# Patient Record
Sex: FEMALE | Race: WHITE | NOT HISPANIC OR LATINO
[De-identification: names, ages, dates, MRNs, and addresses within clinical notes are randomized per-mention and may not be internally consistent; named-entity substitution may affect disease eponyms.]

---

## 2022-01-03 ENCOUNTER — TRANSCRIPTION ENCOUNTER (OUTPATIENT)
Age: 67
End: 2022-01-03

## 2023-02-20 ENCOUNTER — APPOINTMENT (OUTPATIENT)
Dept: ORTHOPEDIC SURGERY | Facility: AMBULATORY MEDICAL SERVICES | Age: 68
End: 2023-02-20
Payer: MEDICARE

## 2023-02-20 PROCEDURE — 99284 EMERGENCY DEPT VISIT MOD MDM: CPT

## 2023-02-24 ENCOUNTER — APPOINTMENT (OUTPATIENT)
Dept: ORTHOPEDIC SURGERY | Facility: AMBULATORY MEDICAL SERVICES | Age: 68
End: 2023-02-24
Payer: MEDICARE

## 2023-02-24 PROCEDURE — 27814 TREATMENT OF ANKLE FRACTURE: CPT | Mod: RT

## 2023-02-24 PROCEDURE — 76000 FLUOROSCOPY <1 HR PHYS/QHP: CPT | Mod: 26,59

## 2023-02-24 PROCEDURE — 27530 TREAT KNEE FRACTURE: CPT | Mod: RT

## 2023-02-27 PROBLEM — Z00.00 ENCOUNTER FOR PREVENTIVE HEALTH EXAMINATION: Status: ACTIVE | Noted: 2023-02-27

## 2023-03-02 ENCOUNTER — NON-APPOINTMENT (OUTPATIENT)
Age: 68
End: 2023-03-02

## 2023-03-08 ENCOUNTER — APPOINTMENT (OUTPATIENT)
Dept: ORTHOPEDIC SURGERY | Facility: CLINIC | Age: 68
End: 2023-03-08

## 2023-04-10 ENCOUNTER — APPOINTMENT (OUTPATIENT)
Dept: ORTHOPEDIC SURGERY | Facility: CLINIC | Age: 68
End: 2023-04-10
Payer: MEDICARE

## 2023-04-10 DIAGNOSIS — Z78.9 OTHER SPECIFIED HEALTH STATUS: ICD-10-CM

## 2023-04-10 PROCEDURE — 99024 POSTOP FOLLOW-UP VISIT: CPT

## 2023-04-10 RX ORDER — GABAPENTIN 100 MG/1
CAPSULE ORAL
Refills: 0 | Status: ACTIVE | COMMUNITY

## 2023-04-10 RX ORDER — OXYCODONE HYDROCHLORIDE 30 MG/1
TABLET ORAL
Refills: 0 | Status: ACTIVE | COMMUNITY

## 2023-04-10 RX ORDER — FLUOXETINE HCL 10 MG
TABLET ORAL
Refills: 0 | Status: ACTIVE | COMMUNITY

## 2023-04-10 RX ORDER — ATORVASTATIN CALCIUM 80 MG/1
TABLET, FILM COATED ORAL
Refills: 0 | Status: ACTIVE | COMMUNITY

## 2023-04-10 NOTE — PHYSICAL EXAM
[2___] : eversion 2[unfilled]/5 [4___] : [facility] 4[unfilled]/5 [2+] : posterior tibialis pulse: 2+ [Normal] : saphenous nerve sensation normal [] : uses wheelchair [Right] : right ankle [Outside films reviewed] : Outside films reviewed [No loss of surgical correlation. Bony alignment acceptable. Hardware in appropriate position] : No loss of surgical correlation. Bony alignment acceptable. Hardware in appropriate position [The fracture is in acceptable alignment. There is progression in healing seen] : The fracture is in acceptable alignment. There is progression in healing seen [de-identified] : plantar flexion 10 degrees [[email or web address]] : dorsiflexion 5 degrees

## 2023-05-08 ENCOUNTER — APPOINTMENT (OUTPATIENT)
Dept: ORTHOPEDIC SURGERY | Facility: CLINIC | Age: 68
End: 2023-05-08
Payer: MEDICARE

## 2023-05-08 PROCEDURE — 99024 POSTOP FOLLOW-UP VISIT: CPT

## 2023-05-08 PROCEDURE — 73610 X-RAY EXAM OF ANKLE: CPT | Mod: RT

## 2023-05-08 NOTE — PHYSICAL EXAM
[2+] : posterior tibialis pulse: 2+ [Normal] : saphenous nerve sensation normal [Right] : right ankle [Outside films reviewed] : Outside films reviewed [No loss of surgical correlation. Bony alignment acceptable. Hardware in appropriate position] : No loss of surgical correlation. Bony alignment acceptable. Hardware in appropriate position [The fracture is in acceptable alignment. There is progression in healing seen] : The fracture is in acceptable alignment. There is progression in healing seen [3___] : eversion 3[unfilled]/5 [5___] : [facility] 5[unfilled]/5 [] : ambulation with cane [[email or web address]] : progression in healing seen [de-identified] : plantar flexion 25 degrees [de-identified] : inversion 15 degrees [de-identified] : eversion 10 degrees [[email or web address]] : dorsiflexion 10 degrees

## 2023-05-31 ENCOUNTER — FORM ENCOUNTER (OUTPATIENT)
Age: 68
End: 2023-05-31

## 2023-06-19 ENCOUNTER — APPOINTMENT (OUTPATIENT)
Dept: ORTHOPEDIC SURGERY | Facility: CLINIC | Age: 68
End: 2023-06-19
Payer: MEDICARE

## 2023-06-19 DIAGNOSIS — S82.841D DISPLACED BIMALLEOLAR FRACTURE OF RIGHT LOWER LEG, SUBSEQUENT ENCOUNTER FOR CLOSED FRACTURE WITH ROUTINE HEALING: ICD-10-CM

## 2023-06-19 PROCEDURE — 73610 X-RAY EXAM OF ANKLE: CPT | Mod: RT

## 2023-06-19 PROCEDURE — 99212 OFFICE O/P EST SF 10 MIN: CPT

## 2023-06-19 NOTE — PHYSICAL EXAM
[5___] : [facility] 5[unfilled]/5 [2+] : posterior tibialis pulse: 2+ [Normal] : saphenous nerve sensation normal [Right] : right ankle [Outside films reviewed] : Outside films reviewed [No loss of surgical correlation. Bony alignment acceptable. Hardware in appropriate position] : No loss of surgical correlation. Bony alignment acceptable. Hardware in appropriate position [The fracture is in acceptable alignment. There is progression in healing seen] : The fracture is in acceptable alignment. There is progression in healing seen [] : no posteromedial tibia tenderness [4___] : eversion 4[unfilled]/5 [[email or web address]] : progression in healing seen [de-identified] : plantar flexion 30 degrees [de-identified] : eversion 15 degrees [de-identified] : inversion 20 degrees [[email or web address]] : dorsiflexion 15 degrees

## 2023-09-18 ENCOUNTER — OFFICE (OUTPATIENT)
Dept: URBAN - METROPOLITAN AREA CLINIC 8 | Facility: CLINIC | Age: 68
Setting detail: OPHTHALMOLOGY
End: 2023-09-18
Payer: MEDICARE

## 2023-09-18 DIAGNOSIS — H43.811: ICD-10-CM

## 2023-09-18 DIAGNOSIS — H02.834: ICD-10-CM

## 2023-09-18 DIAGNOSIS — H25.13: ICD-10-CM

## 2023-09-18 DIAGNOSIS — H02.831: ICD-10-CM

## 2023-09-18 DIAGNOSIS — H52.4: ICD-10-CM

## 2023-09-18 PROCEDURE — 92014 COMPRE OPH EXAM EST PT 1/>: CPT | Performed by: OPHTHALMOLOGY

## 2023-09-18 PROCEDURE — 92015 DETERMINE REFRACTIVE STATE: CPT | Performed by: OPHTHALMOLOGY

## 2023-09-18 ASSESSMENT — REFRACTION_MANIFEST
OD_VA1: 20/20
OS_VA2: 20/20
OS_AXIS: 095
OS_VA2: 20/20
OS_ADD: +2.50
OD_SPHERE: PLANO
OS_CYLINDER: +0.75
OD_CYLINDER: -0.50
OS_VA1: 20/20
OD_AXIS: 095
OD_ADD: +3.00
OU_VA: 20/20
OU_VA: 20/20
OD_SPHERE: PLANO
OS_VA1: 20/20
OD_CYLINDER: -0.50
OD_ADD: +2.50
OS_SPHERE: +0.50
OD_VA2: 20/20
OS_AXIS: 095
OD_AXIS: 095
OS_ADD: +3.00
OD_VA2: 20/20
OS_CYLINDER: -0.75
OS_SPHERE: +0.50
OD_VA1: 20/20

## 2023-09-18 ASSESSMENT — REFRACTION_AUTOREFRACTION
OS_AXIS: 093
OD_AXIS: 096
OD_SPHERE: +0.75
OS_CYLINDER: -1.00
OS_SPHERE: +1.50
OD_CYLINDER: -0.50

## 2023-09-18 ASSESSMENT — KERATOMETRY
OD_K1POWER_DIOPTERS: 42.75
OS_K1POWER_DIOPTERS: 42.25
OD_AXISANGLE_DEGREES: 090
OS_AXISANGLE_DEGREES: 005
OD_K2POWER_DIOPTERS: 42.75
OS_K2POWER_DIOPTERS: 43.00

## 2023-09-18 ASSESSMENT — SPHEQUIV_DERIVED
OD_SPHEQUIV: 0.5
OS_SPHEQUIV: 0.125
OS_SPHEQUIV: 1
OS_SPHEQUIV: 0.875

## 2023-09-18 ASSESSMENT — LID POSITION - DERMATOCHALASIS
OS_DERMATOCHALASIS: LUL 3+
OD_DERMATOCHALASIS: RUL 3+

## 2023-09-18 ASSESSMENT — VISUAL ACUITY
OD_BCVA: 20/25-1
OS_BCVA: 20/25-1

## 2023-09-18 ASSESSMENT — AXIALLENGTH_DERIVED
OD_AL: 23.6729
OS_AL: 23.5724
OS_AL: 23.5239
OS_AL: 23.8678

## 2023-09-18 ASSESSMENT — CONFRONTATIONAL VISUAL FIELD TEST (CVF)
OS_FINDINGS: FULL
OD_FINDINGS: FULL

## 2025-06-26 ENCOUNTER — OFFICE (OUTPATIENT)
Dept: URBAN - METROPOLITAN AREA CLINIC 8 | Facility: CLINIC | Age: 70
Setting detail: OPHTHALMOLOGY
End: 2025-06-26
Payer: MEDICARE

## 2025-06-26 DIAGNOSIS — H43.811: ICD-10-CM

## 2025-06-26 DIAGNOSIS — H02.834: ICD-10-CM

## 2025-06-26 DIAGNOSIS — H43.391: ICD-10-CM

## 2025-06-26 DIAGNOSIS — H01.004: ICD-10-CM

## 2025-06-26 DIAGNOSIS — H35.371: ICD-10-CM

## 2025-06-26 DIAGNOSIS — H01.001: ICD-10-CM

## 2025-06-26 DIAGNOSIS — H25.13: ICD-10-CM

## 2025-06-26 DIAGNOSIS — H02.831: ICD-10-CM

## 2025-06-26 PROCEDURE — 92134 CPTRZ OPH DX IMG PST SGM RTA: CPT | Performed by: OPHTHALMOLOGY

## 2025-06-26 PROCEDURE — 92014 COMPRE OPH EXAM EST PT 1/>: CPT | Performed by: OPHTHALMOLOGY

## 2025-06-26 ASSESSMENT — REFRACTION_MANIFEST
OD_VA2: 20/20
OD_VA1: 20/30-2
OS_CYLINDER: -0.75
OD_ADD: +3.00
OS_AXIS: 095
OD_AXIS: 095
OU_VA: 20/20-2
OS_SPHERE: +1.00
OS_ADD: +3.00
OD_CYLINDER: -0.50
OD_CYLINDER: -0.50
OS_CYLINDER: -0.75
OD_SPHERE: +0.50
OS_AXIS: 095
OS_SPHERE: +0.50
OD_SPHERE: PLANO
OS_VA1: 20/20
OS_VA2: 20/25(J1)
OS_VA1: 20/30-2
OD_ADD: +3.00
OD_AXIS: 080
OS_ADD: +3.00
OD_VA1: 20/20
OD_VA2: 20/25(J1)
OS_VA2: 20/20
OU_VA: 20/20

## 2025-06-26 ASSESSMENT — LID EXAM ASSESSMENTS
OD_BLEPHARITIS: RUL 1+ 2+
OS_BLEPHARITIS: LUL 1+ 2+

## 2025-06-26 ASSESSMENT — REFRACTION_CURRENTRX
OD_OVR_VA: 20/
OS_CYLINDER: SPHERE
OS_OVR_VA: 20/
OD_SPHERE: +3.00
OD_CYLINDER: SPHERE
OS_OVR_VA: 20/
OS_SPHERE: +3.00
OS_SPHERE: +3.50
OD_SPHERE: +3.50
OS_CYLINDER: SPHERE
OD_OVR_VA: 20/
OD_CYLINDER: SPHERE

## 2025-06-26 ASSESSMENT — KERATOMETRY
OD_K1POWER_DIOPTERS: 42.50
OS_AXISANGLE_DEGREES: 090
OD_K2POWER_DIOPTERS: 43.50
OD_AXISANGLE_DEGREES: 114
METHOD_AUTO_MANUAL: AUTO
OS_K2POWER_DIOPTERS: 43.00
OS_K1POWER_DIOPTERS: 43.00

## 2025-06-26 ASSESSMENT — REFRACTION_AUTOREFRACTION
OS_SPHERE: +1.50
OS_CYLINDER: -0.75
OD_CYLINDER: -0.50
OS_AXIS: 097
OD_AXIS: 081
OD_SPHERE: +0.75

## 2025-06-26 ASSESSMENT — CONFRONTATIONAL VISUAL FIELD TEST (CVF)
OD_FINDINGS: FULL
OS_FINDINGS: FULL

## 2025-06-26 ASSESSMENT — VISUAL ACUITY
OD_BCVA: 20/40+1
OS_BCVA: 20/30-2

## 2025-06-26 ASSESSMENT — LID POSITION - DERMATOCHALASIS
OS_DERMATOCHALASIS: LUL 3+
OD_DERMATOCHALASIS: RUL 3+